# Patient Record
Sex: FEMALE | Race: WHITE | NOT HISPANIC OR LATINO | Employment: STUDENT | URBAN - METROPOLITAN AREA
[De-identification: names, ages, dates, MRNs, and addresses within clinical notes are randomized per-mention and may not be internally consistent; named-entity substitution may affect disease eponyms.]

---
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
## 2025-02-05 ENCOUNTER — HOSPITAL ENCOUNTER (EMERGENCY)
Facility: HOSPITAL | Age: 20
Discharge: HOME | End: 2025-02-05
Payer: MEDICAID

## 2025-02-05 VITALS
BODY MASS INDEX: 28.89 KG/M2 | TEMPERATURE: 99 F | HEART RATE: 89 BPM | HEIGHT: 62 IN | WEIGHT: 157 LBS | RESPIRATION RATE: 18 BRPM | OXYGEN SATURATION: 100 % | DIASTOLIC BLOOD PRESSURE: 86 MMHG | SYSTOLIC BLOOD PRESSURE: 105 MMHG

## 2025-02-05 DIAGNOSIS — N39.0 URINARY TRACT INFECTION WITHOUT HEMATURIA, SITE UNSPECIFIED: Primary | ICD-10-CM

## 2025-02-05 LAB
B-HCG UR QL: NEGATIVE
BACTERIA #/AREA URNS AUTO: ABNORMAL /HPF
BILIRUB UR QL STRIP.AUTO: NEGATIVE
CLARITY UR: ABNORMAL
COLOR UR AUTO: YELLOW
GLUCOSE UR QL STRIP: NEGATIVE
HGB UR QL STRIP: NEGATIVE
KETONES UR QL STRIP: 15
LEUKOCYTE ESTERASE UR QL STRIP: ABNORMAL
MUCOUS THREADS URNS QL MICRO: ABNORMAL /LPF
NITRITE UR QL STRIP: NEGATIVE
PH UR STRIP: 6 [PH]
PROT UR QL STRIP: NEGATIVE
RBC #/AREA URNS AUTO: ABNORMAL /HPF
SP GR UR STRIP.AUTO: >=1.03 (ref 1–1.03)
SQUAMOUS #/AREA URNS AUTO: ABNORMAL /HPF
UROBILINOGEN UR STRIP-ACNC: 0.2
WBC #/AREA URNS AUTO: ABNORMAL /HPF

## 2025-02-05 PROCEDURE — 81015 MICROSCOPIC EXAM OF URINE: CPT | Performed by: NURSE PRACTITIONER

## 2025-02-05 PROCEDURE — 81003 URINALYSIS AUTO W/O SCOPE: CPT | Performed by: NURSE PRACTITIONER

## 2025-02-05 PROCEDURE — 81025 URINE PREGNANCY TEST: CPT | Performed by: NURSE PRACTITIONER

## 2025-02-05 PROCEDURE — 63600175 PHARM REV CODE 636 W HCPCS: Mod: JZ,TB | Performed by: NURSE PRACTITIONER

## 2025-02-05 PROCEDURE — 87086 URINE CULTURE/COLONY COUNT: CPT | Performed by: NURSE PRACTITIONER

## 2025-02-05 PROCEDURE — 96372 THER/PROPH/DIAG INJ SC/IM: CPT | Performed by: NURSE PRACTITIONER

## 2025-02-05 PROCEDURE — 99284 EMERGENCY DEPT VISIT MOD MDM: CPT | Mod: 25

## 2025-02-05 RX ORDER — KETOROLAC TROMETHAMINE 30 MG/ML
60 INJECTION, SOLUTION INTRAMUSCULAR; INTRAVENOUS
Status: COMPLETED | OUTPATIENT
Start: 2025-02-05 | End: 2025-02-05

## 2025-02-05 RX ORDER — SULFAMETHOXAZOLE AND TRIMETHOPRIM 800; 160 MG/1; MG/1
1 TABLET ORAL 2 TIMES DAILY
Qty: 14 TABLET | Refills: 0 | Status: SHIPPED | OUTPATIENT
Start: 2025-02-05 | End: 2025-02-12

## 2025-02-05 RX ORDER — DEXAMETHASONE SODIUM PHOSPHATE 4 MG/ML
8 INJECTION, SOLUTION INTRA-ARTICULAR; INTRALESIONAL; INTRAMUSCULAR; INTRAVENOUS; SOFT TISSUE
Status: COMPLETED | OUTPATIENT
Start: 2025-02-05 | End: 2025-02-05

## 2025-02-05 RX ADMIN — KETOROLAC TROMETHAMINE 60 MG: 30 INJECTION, SOLUTION INTRAMUSCULAR at 05:02

## 2025-02-05 RX ADMIN — DEXAMETHASONE SODIUM PHOSPHATE 8 MG: 4 INJECTION, SOLUTION INTRA-ARTICULAR; INTRALESIONAL; INTRAMUSCULAR; INTRAVENOUS; SOFT TISSUE at 05:02

## 2025-02-05 NOTE — Clinical Note
"Jael Morganza"GriselElda was seen and treated in our emergency department on 2/5/2025.  She may return to work on 02/06/2025.       If you have any questions or concerns, please don't hesitate to call.      Diann Razo, TORIE"

## 2025-02-05 NOTE — ED PROVIDER NOTES
"Encounter Date: 2/5/2025       History     Chief Complaint   Patient presents with    Low-back Pain     Low back pain x 2 day without injury  - denies urinary c/o - LBM: this am "normal"     19 year old female presents with lower back pain x 4 days, denies dysuria, denies injury, woke up from sleep with back hurting.     The history is provided by the patient. No  was used.     Review of patient's allergies indicates:   Allergen Reactions    Red dye Itching and Rash     Past Medical History:   Diagnosis Date    Anxiety disorder, unspecified     Asthma     Bipolar disorder, unspecified     COPD (chronic obstructive pulmonary disease)     Depression     GERD (gastroesophageal reflux disease)      Past Surgical History:   Procedure Laterality Date    oral sx       Family History   Problem Relation Name Age of Onset    Breast cancer Neg Hx      Colon cancer Neg Hx      Ovarian cancer Neg Hx      Uterine cancer Neg Hx      Cervical cancer Neg Hx       Social History     Tobacco Use    Smoking status: Every Day     Types: Vaping with nicotine     Start date: 2019    Smokeless tobacco: Never   Substance Use Topics    Alcohol use: Not Currently     Comment: Social    Drug use: Never     Review of Systems   Musculoskeletal:  Positive for back pain.   All other systems reviewed and are negative.      Physical Exam     Initial Vitals [02/05/25 1657]   BP Pulse Resp Temp SpO2   106/63 83 16 98.4 °F (36.9 °C) 98 %      MAP       --         Physical Exam    Nursing note and vitals reviewed.  Constitutional: She appears well-developed and well-nourished.   HENT:   Head: Normocephalic and atraumatic.   Eyes: Conjunctivae and EOM are normal. Pupils are equal, round, and reactive to light.   Neck: Neck supple.   Normal range of motion.  Cardiovascular:  Normal rate, regular rhythm, normal heart sounds and intact distal pulses.           Pulmonary/Chest: Breath sounds normal.   Abdominal: Abdomen is soft. Bowel " sounds are normal.   Musculoskeletal:         General: Normal range of motion.      Cervical back: Normal range of motion and neck supple.     Neurological: She is alert and oriented to person, place, and time. She has normal strength.   Skin: Skin is warm and dry. Capillary refill takes less than 2 seconds.   Psychiatric: She has a normal mood and affect. Her behavior is normal. Judgment and thought content normal.         ED Course   Procedures  Labs Reviewed   URINALYSIS, REFLEX TO URINE CULTURE - Abnormal       Result Value    Color, UA Yellow      Appearance, UA Slightly Cloudy (*)     Specific Gravity, UA >=1.030      pH, UA 6.0      Protein, UA Negative      Glucose, UA Negative      Ketones, UA 15 (*)     Blood, UA Negative      Bilirubin, UA Negative      Urobilinogen, UA 0.2      Nitrites, UA Negative      Leukocyte Esterase, UA Trace (*)     Narrative:      URINE STABILITY IS 2 HOURS AT ROOM TEMP OR    SIX HOURS REFRIGERATED. PERFORMING TESTING ON    SPECIMENS GREATER THAN THIS AGE MAY AFFECT THE    FOLLOWING TESTS:    PH          SPECIFIC GRAVITY           BLOOD    CLARITY     BILIRUBIN               UROBILINOGEN   URINALYSIS, MICROSCOPIC - Abnormal    Bacteria, UA Moderate (*)     Mucous, UA Moderate (*)     RBC, UA 0-5      WBC, UA 6-10 (*)     Squamous Epithelial Cells, UA Moderate (*)    HCG QUALITATIVE URINE - Normal    hCG Qualitative, Urine Negative     CULTURE, URINE          Imaging Results    None          Medications   dexAMETHasone injection 8 mg (8 mg Intramuscular Given 2/5/25 1740)   ketorolac injection 60 mg (60 mg Intramuscular Given 2/5/25 1741)     Medical Decision Making  Problems Addressed:  Urinary tract infection without hematuria, site unspecified: acute illness or injury    Amount and/or Complexity of Data Reviewed  Labs: ordered. Decision-making details documented in ED Course.    Risk  Prescription drug management.                                      Clinical  Impression:  Final diagnoses:  [N39.0] Urinary tract infection without hematuria, site unspecified (Primary)          ED Disposition Condition    Discharge Stable          ED Prescriptions       Medication Sig Dispense Start Date End Date Auth. Provider    sulfamethoxazole-trimethoprim 800-160mg (BACTRIM DS) 800-160 mg Tab Take 1 tablet by mouth 2 (two) times daily. for 7 days 14 tablet 2/5/2025 2/12/2025 Diann Razo FNP          Follow-up Information       Follow up With Specialties Details Why Contact Info    Lalita Moreno, LISSP-C Family Medicine In 3 days If symptoms worsen [address] [facility] [address]  [phone]               Diann Razo FNP  02/05/25 8813

## 2025-02-08 LAB — BACTERIA UR CULT: NORMAL

## 2025-02-10 NOTE — PROGRESS NOTES
Chief Complaint     Absent Menses (Patient hasn't had a period since ab in December )    HPI:     Patient is a 19 y.o.  presents today to discuss menstrual cycle. Reports has not had cycle since Missed AB 24. UPT today in clinic negative. Reports cyclic menses prior to Missed AB.   Currently on Bactrim for UTI, reports s/s improved at this time.     Gyn History:    Menstrual History  Cycle: No (2024)  Menarche Age: 9 years  Flow: Normal  Intermenstrual Bleeding: No  Dysmenorrhea: No  Dysmenorrhea Severity : Mild    Menopause  Menopause Age: 1 years    Pap History  Last pap date:  (never)    [facility]  Sexually Active: Yes  Sexual Orientation: heterosexual  Postcoital Bleeding: No  Dyspareunia: No  STI History: No  Contraception: No    Breast History  Last Breast Imaging Date: No          Past Medical History:   Diagnosis Date    Anxiety disorder, unspecified     Asthma     Bipolar disorder, unspecified     COPD (chronic obstructive pulmonary disease)     Depression     GERD (gastroesophageal reflux disease)        Past Surgical History:   Procedure Laterality Date    oral sx         Family History   Problem Relation Name Age of Onset    Breast cancer Neg Hx      Colon cancer Neg Hx      Ovarian cancer Neg Hx      Uterine cancer Neg Hx      Cervical cancer Neg Hx         OB History          1    Para   0    Term   0       0    AB   0    Living   0         SAB   0    IAB   0    Ectopic   0    Multiple   0    Live Births   0                 Current Outpatient Medications on File Prior to Visit   Medication Sig Dispense Refill    albuterol (PROVENTIL/VENTOLIN HFA) 90 mcg/actuation inhaler 2 puffs every 4 (four) hours as needed.      sulfamethoxazole-trimethoprim 800-160mg (BACTRIM DS) 800-160 mg Tab Take 1 tablet by mouth 2 (two) times daily. for 7 days 14 tablet 0    traZODone (DESYREL) 100 MG tablet Take 100 mg by mouth every evening. (Patient not taking: Reported on 2025)    "    No current facility-administered medications on file prior to visit.       Review of Systems:       Review of Systems   Constitutional:  Negative for chills and fever.   Gastrointestinal:  Negative for abdominal pain, constipation and diarrhea.   Genitourinary:  Negative for bladder incontinence, decreased libido, dysmenorrhea, dyspareunia, dysuria, flank pain, frequency, genital sores, hematuria, hot flashes, menorrhagia, menstrual problem, pelvic pain, urgency, vaginal bleeding, vaginal discharge, vaginal pain, urinary incontinence, postcoital bleeding, postmenopausal bleeding, vaginal dryness and vaginal odor.        Physical Exam:    /68 (BP Location: Right arm, Patient Position: Sitting)   Ht 5' 2" (1.575 m)   Wt 70.8 kg (156 lb)   LMP 12/19/2024 Comment: miscarried 12/18/24, no menstration since that time  BMI 28.53 kg/m²     Physical Exam   General Exam:    General Appearance: alert, in no acute distress, normal, well nourished.  Psych:  Orientation: time, place, person.  Mood/Affect: Normal       Assessment:   1. Oligomenorrhea, unspecified type    2. Missed menses  -     POCT urine pregnancy  -     medroxyPROGESTERone (PROVERA) 10 MG tablet; Take 1 tablet (10 mg total) by mouth once daily. for 10 days  Dispense: 10 tablet; Refill: 0             Plan:     UPT negative today in clinic  Cont Bactrim for UTI, precautions    We discussed her recent lack of cycles since previous miscarriage.  Has been just shy of 2 months.   I recommend continued menstrual diary and monitoring.    Offered contraception to help control cycles patient declines for now.    Offered Provera challenge to see if she does indeed have a withdrawal bleed.  Patient we would like to do this.  Assuming she does have withdrawal bleed if secondary amenorrhea/oligomenorrhea persist beyond 4-6 months we will require further were  Discussed recent miscarriage and change in cycle patterns.    Discussed conservative management vs " Provera challenge. Desires Provera at this time.   Rx Provera 10mg x10 days   If secondary amenorrhea persists, will perform further workup    RTC prn/annual     This note was transcribed by Denae Turcios. There may be transcription errors as a result, however minimal. Effort has been made to ensure accuracy of transcription, but any obvious errors or omissions should be clarified with the author of the document.       I agree with the above documentation.

## 2025-02-11 ENCOUNTER — OFFICE VISIT (OUTPATIENT)
Dept: OBSTETRICS AND GYNECOLOGY | Facility: CLINIC | Age: 20
End: 2025-02-11
Payer: MEDICAID

## 2025-02-11 VITALS
DIASTOLIC BLOOD PRESSURE: 68 MMHG | WEIGHT: 156 LBS | SYSTOLIC BLOOD PRESSURE: 116 MMHG | HEIGHT: 62 IN | BODY MASS INDEX: 28.71 KG/M2

## 2025-02-11 DIAGNOSIS — N92.6 MISSED MENSES: ICD-10-CM

## 2025-02-11 DIAGNOSIS — N91.5 OLIGOMENORRHEA, UNSPECIFIED TYPE: Primary | ICD-10-CM

## 2025-02-11 LAB
B-HCG UR QL: NEGATIVE
CTP QC/QA: YES

## 2025-02-11 PROCEDURE — 3008F BODY MASS INDEX DOCD: CPT | Mod: CPTII,,, | Performed by: OBSTETRICS & GYNECOLOGY

## 2025-02-11 PROCEDURE — 3074F SYST BP LT 130 MM HG: CPT | Mod: CPTII,,, | Performed by: OBSTETRICS & GYNECOLOGY

## 2025-02-11 PROCEDURE — 99213 OFFICE O/P EST LOW 20 MIN: CPT | Mod: ,,, | Performed by: OBSTETRICS & GYNECOLOGY

## 2025-02-11 PROCEDURE — 1159F MED LIST DOCD IN RCRD: CPT | Mod: CPTII,,, | Performed by: OBSTETRICS & GYNECOLOGY

## 2025-02-11 PROCEDURE — 81025 URINE PREGNANCY TEST: CPT | Mod: ,,, | Performed by: OBSTETRICS & GYNECOLOGY

## 2025-02-11 PROCEDURE — 3078F DIAST BP <80 MM HG: CPT | Mod: CPTII,,, | Performed by: OBSTETRICS & GYNECOLOGY

## 2025-02-11 RX ORDER — MEDROXYPROGESTERONE ACETATE 10 MG/1
10 TABLET ORAL DAILY
Qty: 10 TABLET | Refills: 0 | Status: SHIPPED | OUTPATIENT
Start: 2025-02-11 | End: 2025-02-21

## 2025-02-28 ENCOUNTER — HOSPITAL ENCOUNTER (EMERGENCY)
Facility: HOSPITAL | Age: 20
Discharge: HOME | End: 2025-02-28
Payer: MEDICAID

## 2025-02-28 VITALS
SYSTOLIC BLOOD PRESSURE: 111 MMHG | OXYGEN SATURATION: 100 % | DIASTOLIC BLOOD PRESSURE: 71 MMHG | RESPIRATION RATE: 18 BRPM | HEIGHT: 62 IN | BODY MASS INDEX: 30.36 KG/M2 | WEIGHT: 165 LBS | HEART RATE: 79 BPM | TEMPERATURE: 98 F

## 2025-02-28 DIAGNOSIS — N39.0 URINARY TRACT INFECTION WITHOUT HEMATURIA, SITE UNSPECIFIED: Primary | ICD-10-CM

## 2025-02-28 LAB
BACTERIA #/AREA URNS AUTO: ABNORMAL /HPF
BILIRUB UR QL STRIP.AUTO: NEGATIVE
CLARITY UR: CLEAR
COLOR UR AUTO: YELLOW
GLUCOSE UR QL STRIP: NEGATIVE
HGB UR QL STRIP: NEGATIVE
KETONES UR QL STRIP: NEGATIVE
LEUKOCYTE ESTERASE UR QL STRIP: ABNORMAL
MUCOUS THREADS URNS QL MICRO: ABNORMAL /LPF
NITRITE UR QL STRIP: NEGATIVE
PH UR STRIP: 6.5 [PH]
PROT UR QL STRIP: NEGATIVE
RBC #/AREA URNS AUTO: ABNORMAL /HPF
SP GR UR STRIP.AUTO: 1.02 (ref 1–1.03)
SQUAMOUS #/AREA URNS AUTO: ABNORMAL /HPF
UROBILINOGEN UR STRIP-ACNC: 0.2
WBC #/AREA URNS AUTO: ABNORMAL /HPF

## 2025-02-28 PROCEDURE — 81003 URINALYSIS AUTO W/O SCOPE: CPT | Performed by: NURSE PRACTITIONER

## 2025-02-28 PROCEDURE — 81015 MICROSCOPIC EXAM OF URINE: CPT | Performed by: NURSE PRACTITIONER

## 2025-02-28 PROCEDURE — 87086 URINE CULTURE/COLONY COUNT: CPT | Performed by: NURSE PRACTITIONER

## 2025-02-28 PROCEDURE — 99283 EMERGENCY DEPT VISIT LOW MDM: CPT

## 2025-02-28 PROCEDURE — 25000003 PHARM REV CODE 250: Performed by: NURSE PRACTITIONER

## 2025-02-28 RX ORDER — SULFAMETHOXAZOLE AND TRIMETHOPRIM 800; 160 MG/1; MG/1
1 TABLET ORAL 2 TIMES DAILY
Qty: 14 TABLET | Refills: 0 | Status: SHIPPED | OUTPATIENT
Start: 2025-02-28 | End: 2025-03-07

## 2025-02-28 RX ORDER — SULFAMETHOXAZOLE AND TRIMETHOPRIM 800; 160 MG/1; MG/1
1 TABLET ORAL
Status: COMPLETED | OUTPATIENT
Start: 2025-02-28 | End: 2025-02-28

## 2025-02-28 RX ADMIN — SULFAMETHOXAZOLE AND TRIMETHOPRIM 1 TABLET: 800; 160 TABLET ORAL at 07:02

## 2025-02-28 NOTE — Clinical Note
"Jael Morganza" Elda was seen and treated in our emergency department on 2/28/2025.  She may return to work on 03/01/2025.       If you have any questions or concerns, please don't hesitate to call.      Diann Razo, TORIE"

## 2025-03-01 NOTE — ED PROVIDER NOTES
Encounter Date: 2/28/2025       History     Chief Complaint   Patient presents with    Back Pain     Pt reports lower back pain for 3 weeks, seen here, took abx without improvement.     20-year-old female presents with lower back pain x3 weeks, known UTI on February 5th of this year, patient took antibiotic treatment, thinks UTI has returned.    The history is provided by the patient. No  was used.     Review of patient's allergies indicates:   Allergen Reactions    Red dye Itching and Rash     Past Medical History:   Diagnosis Date    Anxiety disorder, unspecified     Asthma     Bipolar disorder, unspecified     COPD (chronic obstructive pulmonary disease)     Depression     GERD (gastroesophageal reflux disease)      Past Surgical History:   Procedure Laterality Date    oral sx       Family History   Problem Relation Name Age of Onset    Breast cancer Neg Hx      Colon cancer Neg Hx      Ovarian cancer Neg Hx      Uterine cancer Neg Hx      Cervical cancer Neg Hx       Social History[1]  Review of Systems   Genitourinary:  Positive for dysuria and urgency.   Musculoskeletal:  Positive for back pain.   All other systems reviewed and are negative.      Physical Exam     Initial Vitals [02/28/25 1844]   BP Pulse Resp Temp SpO2   113/82 85 18 98.4 °F (36.9 °C) 98 %      MAP       --         Physical Exam    Nursing note and vitals reviewed.  Constitutional: She appears well-developed and well-nourished.   HENT:   Head: Normocephalic and atraumatic.   Eyes: Conjunctivae and EOM are normal. Pupils are equal, round, and reactive to light.   Neck: Neck supple.   Normal range of motion.  Cardiovascular:  Normal rate, regular rhythm, normal heart sounds and intact distal pulses.           Pulmonary/Chest: Breath sounds normal.   Abdominal: Abdomen is soft. Bowel sounds are normal.   Musculoskeletal:         General: Normal range of motion.      Cervical back: Normal range of motion and neck supple.      Neurological: She is alert and oriented to person, place, and time. She has normal strength.   Skin: Skin is warm and dry. Capillary refill takes less than 2 seconds.   Psychiatric: She has a normal mood and affect. Her behavior is normal. Judgment and thought content normal.         ED Course   Procedures  Labs Reviewed   URINALYSIS, REFLEX TO URINE CULTURE - Abnormal       Result Value    Color, UA Yellow      Appearance, UA Clear      Specific Gravity, UA 1.020      pH, UA 6.5      Protein, UA Negative      Glucose, UA Negative      Ketones, UA Negative      Blood, UA Negative      Bilirubin, UA Negative      Urobilinogen, UA 0.2      Nitrites, UA Negative      Leukocyte Esterase, UA Trace (*)     Narrative:      URINE STABILITY IS 2 HOURS AT ROOM TEMP OR    SIX HOURS REFRIGERATED. PERFORMING TESTING ON    SPECIMENS GREATER THAN THIS AGE MAY AFFECT THE    FOLLOWING TESTS:    PH          SPECIFIC GRAVITY           BLOOD    CLARITY     BILIRUBIN               UROBILINOGEN   URINALYSIS, MICROSCOPIC - Abnormal    Bacteria, UA Few (*)     Mucous, UA Small (*)     RBC, UA None Seen      WBC, UA 6-10 (*)     Squamous Epithelial Cells, UA Few (*)    CULTURE, URINE          Imaging Results    None          Medications   sulfamethoxazole-trimethoprim 800-160mg per tablet 1 tablet (has no administration in time range)     Medical Decision Making  Problems Addressed:  Urinary tract infection without hematuria, site unspecified: acute illness or injury    Amount and/or Complexity of Data Reviewed  Labs: ordered. Decision-making details documented in ED Course.    Risk  Prescription drug management.                                      Clinical Impression:  Final diagnoses:  [N39.0] Urinary tract infection without hematuria, site unspecified (Primary)          ED Disposition Condition    Discharge Stable          ED Prescriptions       Medication Sig Dispense Start Date End Date Auth. Provider     sulfamethoxazole-trimethoprim 800-160mg (BACTRIM DS) 800-160 mg Tab Take 1 tablet by mouth 2 (two) times daily. for 7 days 14 tablet 2/28/2025 3/7/2025 Diann Razo FNP          Follow-up Information       Follow up With Specialties Details Why Contact Info    Lalita Moreno FNP-C Family Medicine In 3 days If symptoms worsen [address]  [facility] [address]  [phone]                   [1]   Social History  Tobacco Use    Smoking status: Every Day     Types: Vaping with nicotine     Start date: 2019    Smokeless tobacco: Never   Substance Use Topics    Alcohol use: Not Currently     Comment: Social    Drug use: Never        Diann Razo FNP  02/28/25 1919

## 2025-03-03 LAB — BACTERIA UR CULT: NO GROWTH

## 2025-03-24 ENCOUNTER — OFFICE VISIT (OUTPATIENT)
Dept: OBSTETRICS AND GYNECOLOGY | Facility: CLINIC | Age: 20
End: 2025-03-24
Payer: MEDICAID

## 2025-03-24 VITALS
DIASTOLIC BLOOD PRESSURE: 76 MMHG | HEIGHT: 62 IN | SYSTOLIC BLOOD PRESSURE: 118 MMHG | BODY MASS INDEX: 29.04 KG/M2 | WEIGHT: 157.81 LBS | TEMPERATURE: 98 F

## 2025-03-24 DIAGNOSIS — Z32.01 POSITIVE PREGNANCY TEST: ICD-10-CM

## 2025-03-24 DIAGNOSIS — N91.5 OLIGOMENORRHEA, UNSPECIFIED TYPE: Primary | ICD-10-CM

## 2025-03-24 LAB
B-HCG UR QL: POSITIVE
CTP QC/QA: YES

## 2025-03-24 PROCEDURE — 87491 CHLMYD TRACH DNA AMP PROBE: CPT | Performed by: NURSE PRACTITIONER

## 2025-03-24 NOTE — PROGRESS NOTES
Chief Complaint:  c/o missed cycle    History of Present Illness:  Patient is a 20 y.o.  presents c/o missed cycle. Patient's last menstrual period was 2025 (exact date). Prior to this, monthly cycles. Denies any vaginal bleeding or pelvic pain.  Missed Ab 24, was seen by Dr Navarro        Gyn History:  Menstrual History  Cycle: Yes  Menarche Age: 9 years  Flow Duration:  (3 days)  Flow: Normal  Interval: 28  Intermenstrual Bleeding: No  Dysmenorrhea: No    Menopause  Menopause Age: 1 years    Pap History  HPV Vaccine Injection Type: 3 Injection Series (3/3)    [place]  Sexually Active: Yes  Sexual Orientation: heterosexual  Postcoital Bleeding: No  Dyspareunia: No  STI History: No  Contraception: No    Breast History  Last Breast Imaging Date: No  History of Breast Biopsy: No      Review of systems:  General/Constitutional: Chills denies. Fatigue/weakness denies. Fever denies. Night sweats denies. Hot flashes denies  Breast: [place] denies. Breast mass denies. Breast pain/tenderness denies. Nipple discharge denies. [place]uck[place] denies.  Gastrointestinal: Abdominal pain denies. Blood in stool denies. Constipation denies. Diarrhea denies. Heartburn denies. Nausea denies. Vomiting denies   Genitourinary: Incontinence denies. Blood in urine denies. Frequent urination denies. Urgency denies. Painful urination denies. Nocturia denies   Gynecologic: Irregular menses denies. Heavy bleeding denies. Painful menses denies. Vaginal discharge denies. Vaginal odor denies. Vaginal itching/Irritation denies. Vaginal lesion denies.  Pelvic pain denies. Decreased libido denies. Vulvar lesion denies. Prolapse of genital organs denies. Painful intercourse denies. Postcoital bleeding denies   Psychiatric: Mood lability denies. Depressed mood denies. Suicidal thoughts denies. Anxiety denies. Overwhelmed denies. Appetite normal. Energy level normal.     OB History    Para Term  AB Living   1 0 0 0 1 0  "  SAB IAB Ectopic Multiple Live Births   1 0 0 0 0      # 1 - Date: 2024, Sex: None, Weight: None, GA: None, Type: Spontaneous , Apgar1: None, Apgar5: None, Living: Fetal Demise, Birth Comments: None      Past Medical History:   Diagnosis Date    Anxiety disorder, unspecified     Asthma     Bipolar disorder, unspecified     COPD (chronic obstructive pulmonary disease)     Depression     GERD (gastroesophageal reflux disease)      Current Medications[1]      Physical Exam:  /76 (Patient Position: Sitting)   Temp 97.7 °F (36.5 °C)   Ht 5' 2" (1.575 m)   Wt 71.6 kg (157 lb 12.8 oz)   LMP 2025 (Exact Date) Comment: miscarried 24, no menstration since that time  BMI 28.86 kg/m²     Constitutional: General appearance: healthy, well-nourished and well-developed   Psychiatric:  Orientation to time, place and person. Normal mood and affect and active, alert       Assessment/Plan:  1. Oligomenorrhea, positive pregnancy test  UPT+   Educated   No GEOVANNY   PNV, folic acid  Pain/fever/bleeding prec     Based on LMP, GA today 4w2d with MANISHA of 25  Follow up new ob visit in 4 weeks w/ Dr. Lewis    2. Tobacco use  Discussed harmful effects of tobacco and addictive characteristics     Advised cessation of use     Patient states understanding            This note was transcribed by Abby Mtz MA. There may be transcription errors as a result, however minimal. I agree with transcription and every effort has been made to ensure accuracy of transcription, but any obvious errors or omissions should be clarified with the author of the document.                 [1]   Current Outpatient Medications:     albuterol (PROVENTIL/VENTOLIN HFA) 90 mcg/actuation inhaler, 2 puffs every 4 (four) hours as needed., Disp: , Rfl:     "

## 2025-03-26 LAB
C TRACH RRNA SPEC QL NAA+PROBE: NEGATIVE
N GONORRHOEA RRNA SPEC QL NAA+PROBE: NEGATIVE
SPECIMEN SOURCE: NORMAL
SPECIMEN SOURCE: NORMAL

## 2025-03-27 ENCOUNTER — RESULTS FOLLOW-UP (OUTPATIENT)
Dept: OBSTETRICS AND GYNECOLOGY | Facility: CLINIC | Age: 20
End: 2025-03-27

## 2025-04-08 NOTE — PROGRESS NOTES
Chief Complaint:  Initial Prenatal Visit (Pt is here for Initial OB exam. Pt c/o nausea)    History of Present Illness:  Jael Schroeder is a 20 y.o. year old  presents for her new ob, 7w2s by LMP. Patient's last menstrual period was 2025 (exact date).  C/o nausea.       Denies hx of genetic disorders for self, FOB    Gyn History:    Menstrual History  Cycle: Yes (LMP:25)  Menarche Age: 9 years  Flow Duration: 5  Flow: Normal  Interval: 28  Intermenstrual Bleeding: No  Dysmenorrhea: Yes  Dysmenorrhea Severity : Mild    Menopause  Menopause Age: 1 years    Pap History  HPV Vaccine Completed: Yes  HPV Vaccine Injection Type: 3 Injection Series    [place]  Sexually Active: Yes  Sexual Orientation: heterosexual  Postcoital Bleeding: No  Dyspareunia: No  STI History: No  Contraception: No    Breast History  Last Breast Imaging Date: No  History of Breast Biopsy: No    Review of Systems:  General/Constitutional: Chills denies. Fatigue/weakness denies. Fever denies. Night sweats denies. Hot flashes denies.   Respiratory: Cough denies. Hemoptysis denies. SOB denies. Sputum production denies. Wheezing denies.   Cardiovascular: Chest pain denies. Dizziness denies. Palpitations denies. Swelling in hands/feet denies.    Gastrointestinal: Abdominal pain denies. Blood in stool denies. Constipation denies. Diarrhea denies. Heartburn denies. Nausea denies. Vomiting denies.   Genitourinary: Incontinence denies. Blood in urine denies. Frequent urination denies. Painful urination denies.  Urinary urgency denies.  Nocturia denies.   Gynecologic: Irregular menses denies. Heavy bleeding denies. Painful menses denies. Vaginal discharge denies. Vaginal odor denies. Vaginal itching denies. Vaginal lesion denies.  Pelvic pain denies. Decreased libido denies. Vulvar lesion denies. Prolapse of genital organs denies. Painful intercourse denies. Postcoital bleeding denies.   Psychiatric: Depression denies. Anxiety denies.      OB History    Para Term  AB Living   2 0 0 0 1 0   SAB IAB Ectopic Multiple Live Births   1 0 0 0 0      # 1 - Date: 2024, Sex: None, Weight: None, GA: None, Type: Spontaneous , Apgar1: None, Apgar5: None, Living: Fetal Demise, Birth Comments: None    # 2 - Date: None, Sex: None, Weight: None, GA: None, Type: None, Apgar1: None, Apgar5: None, Living: None, Birth Comments: None        Past Medical History:   Diagnosis Date    Anxiety disorder, unspecified     Asthma     Bipolar disorder, unspecified     COPD (chronic obstructive pulmonary disease)     Depression     GERD (gastroesophageal reflux disease)      Current Medications[1]    Review of patient's allergies indicates:   Allergen Reactions    Red dye Itching and Rash       Past Surgical History:   Procedure Laterality Date    oral sx       Family History   Problem Relation Name Age of Onset    Breast cancer Neg Hx      Colon cancer Neg Hx      Ovarian cancer Neg Hx      Uterine cancer Neg Hx      Cervical cancer Neg Hx       Social History[2]    Physical Exam:  /72   Wt 73.6 kg (162 lb 3.2 oz)   LMP 2025 (Exact Date) Comment: miscarried 24, no menstration since that time  BMI 29.67 kg/m²     Chaperone: present.   General Exam:    General Appearance: alert, in no acute distress, normal, well nourished.  Psych:  Orientation: time, place, person.  Mood/Affect: Normal   Abdomen:  - Soft. Non-tender. No rebound tenderness or guardingNo masses. Liver normal. Spleen normal. No hernia palpable.  Pelvis:   - Vulva: Normal   - Perianal skin: Normal   - Urethra: Normal meatus. Q-tip: Not performed   - Vagina: NormalVaginal discharge present: . Cystocele: Absent. Rectocele: Absent   - Cervix: Normal. Cervical motion tenderness Absent   - Uterus: Mobile. Non-tender.   - Adnexal: Normal       TVUS:    IUP W CRL: 7w3d  MANISHA: 25 by LMP c/w today's US  FHT:150  OVARIES: normal  FREE FLUID:absent      Assessment/Plan:  1.  Anxiety during pregnancy  -     Hepatitis C Antibody; Future; Expected date: 04/14/2025  -     HIV 1/2 Ag/Ab (4th Gen); Future; Expected date: 04/14/2025  -     Hepatitis B surface antigen; Future; Expected date: 04/14/2025  -     Type & Screen; Future; Expected date: 04/14/2025  -     Rubella antibody, IgG; Future; Expected date: 04/14/2025  -     Urine culture  -     CBC auto differential; Future; Expected date: 04/14/2025  -     Basic metabolic panel; Future; Expected date: 04/14/2025  -     TSH; Future; Expected date: 04/14/2025  -     Hemoglobin A1C; Future; Expected date: 04/14/2025  -     Varicella Zoster Antibody, IgG; Future; Expected date: 04/14/2025    2. 7 weeks gestation of pregnancy    3. Positive pregnancy test  -     C.trach/N.gonor AMP RNA  -     Trichomonas vaginalis, RNA, Qual  -     Urine Culture High Risk  -     US OB/GYN Procedure ([facility]) - Extended List  -     POCT Urinalysis, Dipstick, Automated, W/O Scope    4. Asthma during pregnancy  -     Hepatitis C Antibody; Future; Expected date: 04/14/2025  -     HIV 1/2 Ag/Ab (4th Gen); Future; Expected date: 04/14/2025  -     Hepatitis B surface antigen; Future; Expected date: 04/14/2025  -     Type & Screen; Future; Expected date: 04/14/2025  -     Rubella antibody, IgG; Future; Expected date: 04/14/2025  -     Urine culture  -     CBC auto differential; Future; Expected date: 04/14/2025  -     Basic metabolic panel; Future; Expected date: 04/14/2025  -     TSH; Future; Expected date: 04/14/2025  -     Hemoglobin A1C; Future; Expected date: 04/14/2025  -     Varicella Zoster Antibody, IgG; Future; Expected date: 04/14/2025    5. Depression affecting pregnancy  -     Hepatitis C Antibody; Future; Expected date: 04/14/2025  -     HIV 1/2 Ag/Ab (4th Gen); Future; Expected date: 04/14/2025  -     Hepatitis B surface antigen; Future; Expected date: 04/14/2025  -     Type & Screen; Future; Expected date: 04/14/2025  -     Rubella antibody, IgG;  Future; Expected date: 04/14/2025  -     Urine culture  -     CBC auto differential; Future; Expected date: 04/14/2025  -     Basic metabolic panel; Future; Expected date: 04/14/2025  -     TSH; Future; Expected date: 04/14/2025  -     Hemoglobin A1C; Future; Expected date: 04/14/2025  -     Varicella Zoster Antibody, IgG; Future; Expected date: 04/14/2025    6. Gastroesophageal reflux in pregnancy  -     Hepatitis C Antibody; Future; Expected date: 04/14/2025  -     HIV 1/2 Ag/Ab (4th Gen); Future; Expected date: 04/14/2025  -     Hepatitis B surface antigen; Future; Expected date: 04/14/2025  -     Type & Screen; Future; Expected date: 04/14/2025  -     Rubella antibody, IgG; Future; Expected date: 04/14/2025  -     Urine culture  -     CBC auto differential; Future; Expected date: 04/14/2025  -     Basic metabolic panel; Future; Expected date: 04/14/2025  -     TSH; Future; Expected date: 04/14/2025  -     Hemoglobin A1C; Future; Expected date: 04/14/2025  -     Varicella Zoster Antibody, IgG; Future; Expected date: 04/14/2025    7. Nausea and vomiting during pregnancy           Prenatal counseling  Discussed appropriate weight gain for pregnancy  Tobacco avoidance/cessation  Illicit drug avoidance  PNL  PNV, folic acid     GC/CZ/TV  urine    Breastfeeding education given     Discussed risk of asthma in pregnancy.  Genu albuterol PRN.    Discussed risk of anxiety and depression in pregnancy.  Currently stable without medication.    Discussed history of gastroesophageal reflux in pregnancy.  This may worsen over the course of pregnancy.  Discussed over-the-counter medications such as Pepcid or Prilosec of which are generally safe for pregnancy.     B6/unisom prn N/V  Call if no relief     RTC 4 weeks.      This note was transcribed by Denae Turcios. There may be transcription errors as a result, however minimal. Effort has been made to ensure accuracy of transcription, but any obvious errors or omissions should be  clarified with the author of the document.       I agree with the above documentation.                [1]   Current Outpatient Medications:     albuterol (PROVENTIL/VENTOLIN HFA) 90 mcg/actuation inhaler, 2 puffs every 4 (four) hours as needed., Disp: , Rfl:   [2]   Social History  Socioeconomic History    Marital status: Single   Tobacco Use    Smoking status: Some Days     Types: Vaping with nicotine     Start date: 2019    Smokeless tobacco: Never   Substance and Sexual Activity    Alcohol use: Not Currently     Comment: Social    Drug use: Never    Sexual activity: Yes     Partners: Male     Birth control/protection: None

## 2025-04-14 ENCOUNTER — INITIAL PRENATAL (OUTPATIENT)
Dept: OBSTETRICS AND GYNECOLOGY | Facility: CLINIC | Age: 20
End: 2025-04-14
Payer: MEDICAID

## 2025-04-14 ENCOUNTER — RESULTS FOLLOW-UP (OUTPATIENT)
Dept: OBSTETRICS AND GYNECOLOGY | Facility: HOSPITAL | Age: 20
End: 2025-04-14
Payer: MEDICAID

## 2025-04-14 VITALS
DIASTOLIC BLOOD PRESSURE: 72 MMHG | SYSTOLIC BLOOD PRESSURE: 116 MMHG | WEIGHT: 162.19 LBS | BODY MASS INDEX: 29.67 KG/M2

## 2025-04-14 DIAGNOSIS — O99.619 GASTROESOPHAGEAL REFLUX IN PREGNANCY: ICD-10-CM

## 2025-04-14 DIAGNOSIS — N39.0 URINARY TRACT INFECTION WITHOUT HEMATURIA, SITE UNSPECIFIED: Primary | ICD-10-CM

## 2025-04-14 DIAGNOSIS — F41.9 ANXIETY DURING PREGNANCY: Primary | ICD-10-CM

## 2025-04-14 DIAGNOSIS — F32.A DEPRESSION AFFECTING PREGNANCY: ICD-10-CM

## 2025-04-14 DIAGNOSIS — Z3A.01 7 WEEKS GESTATION OF PREGNANCY: ICD-10-CM

## 2025-04-14 DIAGNOSIS — O99.519 ASTHMA DURING PREGNANCY: ICD-10-CM

## 2025-04-14 DIAGNOSIS — O99.340 DEPRESSION AFFECTING PREGNANCY: ICD-10-CM

## 2025-04-14 DIAGNOSIS — O99.340 ANXIETY DURING PREGNANCY: Primary | ICD-10-CM

## 2025-04-14 DIAGNOSIS — K21.9 GASTROESOPHAGEAL REFLUX IN PREGNANCY: ICD-10-CM

## 2025-04-14 DIAGNOSIS — J45.909 ASTHMA DURING PREGNANCY: ICD-10-CM

## 2025-04-14 DIAGNOSIS — Z32.01 POSITIVE PREGNANCY TEST: ICD-10-CM

## 2025-04-14 DIAGNOSIS — O21.9 NAUSEA AND VOMITING DURING PREGNANCY: ICD-10-CM

## 2025-04-14 LAB
BILIRUB UR QL STRIP: NEGATIVE
GLUCOSE UR QL STRIP: NEGATIVE
KETONES UR QL STRIP: NEGATIVE
LEUKOCYTE ESTERASE UR QL STRIP: POSITIVE
PH, POC UA: 5.5
POC BLOOD, URINE: NEGATIVE
POC NITRATES, URINE: POSITIVE
PROT UR QL STRIP: NEGATIVE
SP GR UR STRIP: 1.02 (ref 1–1.03)
UROBILINOGEN UR STRIP-ACNC: 0.2 (ref 0.1–1.1)

## 2025-04-14 PROCEDURE — 87661 TRICHOMONAS VAGINALIS AMPLIF: CPT | Performed by: OBSTETRICS & GYNECOLOGY

## 2025-04-14 PROCEDURE — 87086 URINE CULTURE/COLONY COUNT: CPT | Performed by: OBSTETRICS & GYNECOLOGY

## 2025-04-14 PROCEDURE — 87591 N.GONORRHOEAE DNA AMP PROB: CPT | Performed by: OBSTETRICS & GYNECOLOGY

## 2025-04-14 RX ORDER — CEPHALEXIN 500 MG/1
500 CAPSULE ORAL 4 TIMES DAILY
Qty: 28 CAPSULE | Refills: 0 | Status: CANCELLED | OUTPATIENT
Start: 2025-04-14 | End: 2025-04-21

## 2025-04-14 NOTE — PROGRESS NOTES
Phoned patient to notify of results. Patient has allergy to red dye, contraindication to Keflex. Will notify Dr. Lewis on tomorrow and f/u with patient. Patient verbalized understanding.

## 2025-04-15 RX ORDER — CEPHALEXIN 500 MG/1
500 CAPSULE ORAL 2 TIMES DAILY
Qty: 14 CAPSULE | Refills: 0 | Status: SHIPPED | OUTPATIENT
Start: 2025-04-15 | End: 2025-04-22

## 2025-04-18 LAB — BACTERIA UR CULT: ABNORMAL

## 2025-05-02 ENCOUNTER — LAB VISIT (OUTPATIENT)
Dept: LAB | Facility: HOSPITAL | Age: 20
End: 2025-05-02
Attending: OBSTETRICS & GYNECOLOGY
Payer: MEDICAID

## 2025-05-02 DIAGNOSIS — O99.340 DEPRESSION AFFECTING PREGNANCY: ICD-10-CM

## 2025-05-02 DIAGNOSIS — O99.519 ASTHMA DURING PREGNANCY: ICD-10-CM

## 2025-05-02 DIAGNOSIS — F32.A DEPRESSION AFFECTING PREGNANCY: ICD-10-CM

## 2025-05-02 DIAGNOSIS — J45.909 ASTHMA DURING PREGNANCY: ICD-10-CM

## 2025-05-02 DIAGNOSIS — F41.9 ANXIETY DURING PREGNANCY: ICD-10-CM

## 2025-05-02 DIAGNOSIS — O99.340 ANXIETY DURING PREGNANCY: ICD-10-CM

## 2025-05-02 DIAGNOSIS — K21.9 GASTROESOPHAGEAL REFLUX IN PREGNANCY: ICD-10-CM

## 2025-05-02 DIAGNOSIS — O99.619 GASTROESOPHAGEAL REFLUX IN PREGNANCY: ICD-10-CM

## 2025-05-02 LAB
ANION GAP SERPL CALC-SCNC: 10 MEQ/L (ref 2–13)
BASOPHILS # BLD AUTO: 0.03 X10(3)/MCL (ref 0.01–0.08)
BASOPHILS NFR BLD AUTO: 0.2 % (ref 0.1–1.2)
BUN SERPL-MCNC: 6 MG/DL (ref 7–20)
CALCIUM SERPL-MCNC: 9.9 MG/DL (ref 8.4–10.2)
CHLORIDE SERPL-SCNC: 104 MMOL/L (ref 98–110)
CO2 SERPL-SCNC: 21 MMOL/L (ref 21–32)
CREAT SERPL-MCNC: 0.49 MG/DL (ref 0.66–1.25)
CREAT/UREA NIT SERPL: 12 (ref 12–20)
EOSINOPHIL # BLD AUTO: 0.02 X10(3)/MCL (ref 0.04–0.36)
EOSINOPHIL NFR BLD AUTO: 0.2 % (ref 0.7–7)
ERYTHROCYTE [DISTWIDTH] IN BLOOD BY AUTOMATED COUNT: 12.7 % (ref 11–14.5)
EST. AVERAGE GLUCOSE BLD GHB EST-MCNC: 93.9 MG/DL (ref 70–115)
GFR SERPLBLD CREATININE-BSD FMLA CKD-EPI: >90 ML/MIN/1.73/M2
GLUCOSE SERPL-MCNC: 81 MG/DL (ref 70–115)
GROUP & RH: NORMAL
HBA1C MFR BLD: 4.9 % (ref 4–6)
HBV SURFACE AG SERPL QL IA: NEGATIVE
HBV SURFACE AG SERPL QL IA: NORMAL
HCT VFR BLD AUTO: 36.9 % (ref 36–48)
HCV AB SERPL QL IA: NONREACTIVE
HGB BLD-MCNC: 12.9 G/DL (ref 11.8–16)
HIV 1+2 AB+HIV1 P24 AG SERPL QL IA: NONREACTIVE
IMM GRANULOCYTES # BLD AUTO: 0.06 X10(3)/MCL (ref 0–0.03)
IMM GRANULOCYTES NFR BLD AUTO: 0.5 % (ref 0–0.5)
INDIRECT COOMBS: NORMAL
LYMPHOCYTES # BLD AUTO: 2.34 X10(3)/MCL (ref 1.16–3.74)
LYMPHOCYTES NFR BLD AUTO: 19.1 % (ref 20–55)
MCH RBC QN AUTO: 30.6 PG (ref 27–34)
MCHC RBC AUTO-ENTMCNC: 35 G/DL (ref 31–37)
MCV RBC AUTO: 87.6 FL (ref 79–99)
MONOCYTES # BLD AUTO: 0.79 X10(3)/MCL (ref 0.24–0.36)
MONOCYTES NFR BLD AUTO: 6.4 % (ref 4.7–12.5)
NEUTROPHILS # BLD AUTO: 9.04 X10(3)/MCL (ref 1.56–6.13)
NEUTROPHILS NFR BLD AUTO: 73.6 % (ref 37–73)
NRBC BLD AUTO-RTO: 0 %
PLATELET # BLD AUTO: 304 X10(3)/MCL (ref 140–371)
PMV BLD AUTO: 10.3 FL (ref 9.4–12.4)
POTASSIUM SERPL-SCNC: 4.1 MMOL/L (ref 3.5–5.1)
RBC # BLD AUTO: 4.21 X10(6)/MCL (ref 4–5.1)
RUBELLA AB, IGG (OLG): 2.12 IU/ML
SODIUM SERPL-SCNC: 135 MMOL/L (ref 136–145)
SPECIMEN OUTDATE: NORMAL
TSH SERPL-ACNC: 2.54 UIU/ML (ref 0.36–3.74)
WBC # BLD AUTO: 12.28 X10(3)/MCL (ref 4–11.5)

## 2025-05-02 PROCEDURE — 83036 HEMOGLOBIN GLYCOSYLATED A1C: CPT

## 2025-05-02 PROCEDURE — 87389 HIV-1 AG W/HIV-1&-2 AB AG IA: CPT

## 2025-05-02 PROCEDURE — 86850 RBC ANTIBODY SCREEN: CPT | Performed by: OBSTETRICS & GYNECOLOGY

## 2025-05-02 PROCEDURE — 87340 HEPATITIS B SURFACE AG IA: CPT

## 2025-05-02 PROCEDURE — 86762 RUBELLA ANTIBODY: CPT

## 2025-05-02 PROCEDURE — 36415 COLL VENOUS BLD VENIPUNCTURE: CPT

## 2025-05-02 PROCEDURE — 86787 VARICELLA-ZOSTER ANTIBODY: CPT

## 2025-05-02 PROCEDURE — 84443 ASSAY THYROID STIM HORMONE: CPT

## 2025-05-02 PROCEDURE — 85025 COMPLETE CBC W/AUTO DIFF WBC: CPT

## 2025-05-02 PROCEDURE — 80048 BASIC METABOLIC PNL TOTAL CA: CPT

## 2025-05-02 PROCEDURE — 86803 HEPATITIS C AB TEST: CPT

## 2025-05-05 LAB
VZV IGG SER IA-ACNC: 0.3
VZV IGG SER QL IA: NEGATIVE

## 2025-05-07 NOTE — PROGRESS NOTES
HPI  20 y.o.  at 11w2d  here for OB check. Feeling better.  Nausea improving.        ROS  Review of Systems   Constitutional:  Negative for chills and fever.   Gastrointestinal:  Negative for abdominal pain, constipation and diarrhea.   Genitourinary:  Negative for flank pain, genital sores, pelvic pain, urgency, vaginal bleeding, vaginal discharge, vaginal pain, postcoital bleeding and vaginal odor.         OBJECTIVE  /78   Wt 73.9 kg (163 lb)   LMP 2025 (Exact Date) Comment: miscarried 24, no menstration since that time  BMI 29.81 kg/m²     Gen: No distress  Abdomen: Gravid, non-tender  Extremities: No edema   FHT: 170      ASSESSMENT  1. Depression affecting pregnancy    2. 11 weeks gestation of pregnancy  - POCT Urinalysis, Dipstick, Automated, W/O Scope    3. Anxiety during pregnancy    4. Gastroesophageal reflux in pregnancy    5. Nausea and vomiting during pregnancy    6. Asthma during pregnancy    7. Maternal varicella, non-immune    8. Rubella non-immune status, antepartum        PLAN  Reviewed standard labor unit and kick count precautions.  Discussed pre-eclampsia precautions.  Discussed COVID-19 risks, social distancing, and isolation if respiratory symptoms develop.     Discussed Rubella non immune. Avoidance of children with rash, viral illness. Educated on strict precautions.     Varicella nonimmune. Recommend vaccine following delivery. Avoidance of sick children, rashes, chicken pox.     N/V improving  Doing well    RTC 4 WEEKS OB CHECK     This note was transcribed by Denae Turcios. There may be transcription errors as a result, however minimal. Effort has been made to ensure accuracy of transcription, but any obvious errors or omissions should be clarified with the author of the document.       I agree with the above documentation.

## 2025-05-12 ENCOUNTER — ROUTINE PRENATAL (OUTPATIENT)
Dept: OBSTETRICS AND GYNECOLOGY | Facility: CLINIC | Age: 20
End: 2025-05-12
Payer: MEDICAID

## 2025-05-12 VITALS — DIASTOLIC BLOOD PRESSURE: 78 MMHG | BODY MASS INDEX: 29.81 KG/M2 | SYSTOLIC BLOOD PRESSURE: 122 MMHG | WEIGHT: 163 LBS

## 2025-05-12 DIAGNOSIS — J45.909 ASTHMA DURING PREGNANCY: ICD-10-CM

## 2025-05-12 DIAGNOSIS — Z28.39 MATERNAL VARICELLA, NON-IMMUNE: ICD-10-CM

## 2025-05-12 DIAGNOSIS — O21.9 NAUSEA AND VOMITING DURING PREGNANCY: ICD-10-CM

## 2025-05-12 DIAGNOSIS — O99.340 ANXIETY DURING PREGNANCY: ICD-10-CM

## 2025-05-12 DIAGNOSIS — O99.619 GASTROESOPHAGEAL REFLUX IN PREGNANCY: ICD-10-CM

## 2025-05-12 DIAGNOSIS — Z28.39 RUBELLA NON-IMMUNE STATUS, ANTEPARTUM: ICD-10-CM

## 2025-05-12 DIAGNOSIS — O09.899 RUBELLA NON-IMMUNE STATUS, ANTEPARTUM: ICD-10-CM

## 2025-05-12 DIAGNOSIS — Z3A.11 11 WEEKS GESTATION OF PREGNANCY: ICD-10-CM

## 2025-05-12 DIAGNOSIS — F41.9 ANXIETY DURING PREGNANCY: ICD-10-CM

## 2025-05-12 DIAGNOSIS — F32.A DEPRESSION AFFECTING PREGNANCY: Primary | ICD-10-CM

## 2025-05-12 DIAGNOSIS — O99.519 ASTHMA DURING PREGNANCY: ICD-10-CM

## 2025-05-12 DIAGNOSIS — O99.340 DEPRESSION AFFECTING PREGNANCY: Primary | ICD-10-CM

## 2025-05-12 DIAGNOSIS — O09.899 MATERNAL VARICELLA, NON-IMMUNE: ICD-10-CM

## 2025-05-12 DIAGNOSIS — K21.9 GASTROESOPHAGEAL REFLUX IN PREGNANCY: ICD-10-CM

## 2025-06-09 ENCOUNTER — ROUTINE PRENATAL (OUTPATIENT)
Dept: OBSTETRICS AND GYNECOLOGY | Facility: CLINIC | Age: 20
End: 2025-06-09
Payer: MEDICAID

## 2025-06-09 VITALS — DIASTOLIC BLOOD PRESSURE: 78 MMHG | BODY MASS INDEX: 30 KG/M2 | SYSTOLIC BLOOD PRESSURE: 120 MMHG | WEIGHT: 164 LBS

## 2025-06-09 DIAGNOSIS — Z28.39 MATERNAL VARICELLA, NON-IMMUNE: ICD-10-CM

## 2025-06-09 DIAGNOSIS — F32.A DEPRESSION AFFECTING PREGNANCY: Primary | ICD-10-CM

## 2025-06-09 DIAGNOSIS — O21.9 NAUSEA AND VOMITING DURING PREGNANCY: ICD-10-CM

## 2025-06-09 DIAGNOSIS — O99.340 ANXIETY DURING PREGNANCY: ICD-10-CM

## 2025-06-09 DIAGNOSIS — O99.340 DEPRESSION AFFECTING PREGNANCY: Primary | ICD-10-CM

## 2025-06-09 DIAGNOSIS — O09.899 MATERNAL VARICELLA, NON-IMMUNE: ICD-10-CM

## 2025-06-09 DIAGNOSIS — F41.9 ANXIETY DURING PREGNANCY: ICD-10-CM

## 2025-06-09 DIAGNOSIS — O99.519 ASTHMA DURING PREGNANCY: ICD-10-CM

## 2025-06-09 DIAGNOSIS — K21.9 GASTROESOPHAGEAL REFLUX IN PREGNANCY: ICD-10-CM

## 2025-06-09 DIAGNOSIS — Z28.39 RUBELLA NON-IMMUNE STATUS, ANTEPARTUM: ICD-10-CM

## 2025-06-09 DIAGNOSIS — O99.619 GASTROESOPHAGEAL REFLUX IN PREGNANCY: ICD-10-CM

## 2025-06-09 DIAGNOSIS — J45.909 ASTHMA DURING PREGNANCY: ICD-10-CM

## 2025-06-09 DIAGNOSIS — O09.899 RUBELLA NON-IMMUNE STATUS, ANTEPARTUM: ICD-10-CM

## 2025-06-09 DIAGNOSIS — Z3A.15 15 WEEKS GESTATION OF PREGNANCY: ICD-10-CM

## 2025-06-09 LAB
BILIRUB UR QL STRIP: ABNORMAL
GLUCOSE UR QL STRIP: NEGATIVE
KETONES UR QL STRIP: ABNORMAL
LEUKOCYTE ESTERASE UR QL STRIP: NEGATIVE
PH, POC UA: ABNORMAL
POC BLOOD, URINE: ABNORMAL
POC NITRATES, URINE: NEGATIVE
PROT UR QL STRIP: NEGATIVE
SP GR UR STRIP: ABNORMAL (ref 1–1.03)
UROBILINOGEN UR STRIP-ACNC: ABNORMAL (ref 0.3–2.2)

## 2025-06-09 PROCEDURE — 99214 OFFICE O/P EST MOD 30 MIN: CPT | Mod: TH,,, | Performed by: OBSTETRICS & GYNECOLOGY

## 2025-06-09 NOTE — PROGRESS NOTES
HPI  20 y.o.  at 15w2d  here for OB check.  Nausea improved, but still present daily. Worse after coughing.         ROS  Review of Systems   Constitutional:  Negative for chills and fever.   Gastrointestinal:  Negative for abdominal pain, constipation and diarrhea.   Genitourinary:  Negative for flank pain, genital sores, pelvic pain, urgency, vaginal bleeding, vaginal discharge, vaginal pain, postcoital bleeding and vaginal odor.         OBJECTIVE  /78   Wt 74.4 kg (164 lb)   LMP 2025 (Exact Date) Comment: miscarried 24, no menstration since that time  BMI 30.00 kg/m²     Gen: No distress  Abdomen: Gravid, non-tender  Extremities: No edema    FHT: 155        ASSESSMENT  1. Depression affecting pregnancy    2. 15 weeks gestation of pregnancy  - POCT Urinalysis, Dipstick, Automated, W/O Scope  - US OB 14+ Wks, TransAbd, Single Gestation; Future    3. Anxiety during pregnancy    4. Gastroesophageal reflux in pregnancy    5. Rubella non-immune status, antepartum    6. Nausea and vomiting during pregnancy    7. Maternal varicella, non-immune    8. Asthma during pregnancy        PLAN  Reviewed standard labor unit and kick count precautions.  Discussed pre-eclampsia precautions.  Discussed COVID-19 risks, social distancing, and isolation if respiratory symptoms develop.     Anatomy scan to be done around 20 weeks gestation. Estimated Date of Delivery: 25  B6/unisom prn N/V  Consider OTC prilosec for reflux  Mood stable    RTC 4 weeks

## 2025-06-14 ENCOUNTER — PATIENT MESSAGE (OUTPATIENT)
Dept: OTHER | Facility: OTHER | Age: 20
End: 2025-06-14
Payer: MEDICAID

## 2025-06-21 ENCOUNTER — PATIENT MESSAGE (OUTPATIENT)
Dept: OTHER | Facility: OTHER | Age: 20
End: 2025-06-21
Payer: MEDICAID

## 2025-06-23 ENCOUNTER — TELEPHONE (OUTPATIENT)
Dept: OBSTETRICS AND GYNECOLOGY | Facility: CLINIC | Age: 20
End: 2025-06-23
Payer: MEDICAID

## 2025-06-23 DIAGNOSIS — O26.892 PREGNANCY HEADACHE IN SECOND TRIMESTER: Primary | ICD-10-CM

## 2025-06-23 DIAGNOSIS — R51.9 PREGNANCY HEADACHE IN SECOND TRIMESTER: Primary | ICD-10-CM

## 2025-06-23 NOTE — TELEPHONE ENCOUNTER
Notified patient that KYLEE is out today, will speak with him tomorrow. Pt verbalized understanding.

## 2025-06-23 NOTE — TELEPHONE ENCOUNTER
----- Message from Brylee sent at 6/23/2025 10:07 AM CDT -----  Regarding: Pt Advice  Contact: Jael  Patient called stating that she has been having frequent migraines for the last two weeks. She has been taking tylenol for them but states that it is not helping. She said that they last for 2 days then go away for a day then return. She wants to know if she can be prescribed some medication to help. Patient call back number [phone].      [facility] - [address] [address]  Phone: [phone] Fax: [phone]

## 2025-06-25 RX ORDER — BUTALBITAL, ACETAMINOPHEN AND CAFFEINE 50; 325; 40 MG/1; MG/1; MG/1
1 TABLET ORAL EVERY 4 HOURS PRN
Qty: 30 TABLET | Refills: 0 | Status: SHIPPED | OUTPATIENT
Start: 2025-06-25 | End: 2025-07-25

## 2025-06-25 NOTE — TELEPHONE ENCOUNTER
Per , fioricet q 4 hours PRN headaches, dispense 30 with 0 refills. PO hydration, precautions. voiced understanding.

## 2025-06-26 ENCOUNTER — TELEPHONE (OUTPATIENT)
Dept: OBSTETRICS AND GYNECOLOGY | Facility: CLINIC | Age: 20
End: 2025-06-26
Payer: MEDICAID

## 2025-06-26 NOTE — TELEPHONE ENCOUNTER
----- Message from Brylee sent at 6/26/2025 10:42 AM CDT -----  Regarding: Pt Advice  Contact: Jael  Patient called stating that [facility] had not received her prescription. Called the pharmacy and they verified that they have not received a new prescription for the patient. butalbital-acetaminophen-caffeine -40 mg (FIORICET, ESGIC) -40 mg per tablet    Patient call back number [phone]

## 2025-07-09 NOTE — PROGRESS NOTES
"HPI  20 y.o.  at 20w2d  here for OB check.  Doing well.   Reports positive fetal movement. denies LOF, CTX.       ROS  Review of Systems   Constitutional:  Negative for chills and fever.   Gastrointestinal:  Negative for abdominal pain, constipation and diarrhea.   Genitourinary:  Negative for flank pain, genital sores, pelvic pain, urgency, vaginal bleeding, vaginal discharge, vaginal pain, postcoital bleeding and vaginal odor.         OBJECTIVE  /68   Temp 98.2 °F (36.8 °C)   Ht 5' 2" (1.575 m)   LMP 2025 (Exact Date) Comment: miscarried 24, no menstration since that time  BMI 30.00 kg/m²     Gen: No distress  Abdomen: Gravid, non-tender  Extremities: No edema    FHT: 140    ASSESSMENT  1. Maternal varicella, non-immune    2. 20 weeks gestation of pregnancy  - POCT Urinalysis, Dipstick, Automated, W/O Scope    3. Depression affecting pregnancy    4. Anxiety during pregnancy    5. Rubella non-immune status, antepartum    6. Asthma during pregnancy    7. Gastroesophageal reflux in pregnancy        PLAN  Reviewed standard labor unit and kick count precautions.  Discussed pre-eclampsia precautions.  Discussed COVID-19 risks, social distancing, and isolation if respiratory symptoms develop.     Anatomy scan 7/15    Mood stable    RTC 4 weeks ob check     This note was transcribed by Denae Turcios. There may be transcription errors as a result, however minimal. Effort has been made to ensure accuracy of transcription, but any obvious errors or omissions should be clarified with the author of the document.       I agree with the above documentation.       "

## 2025-07-12 ENCOUNTER — PATIENT MESSAGE (OUTPATIENT)
Dept: OTHER | Facility: OTHER | Age: 20
End: 2025-07-12
Payer: MEDICAID

## 2025-07-14 ENCOUNTER — ROUTINE PRENATAL (OUTPATIENT)
Dept: OBSTETRICS AND GYNECOLOGY | Facility: CLINIC | Age: 20
End: 2025-07-14
Payer: MEDICAID

## 2025-07-14 VITALS
DIASTOLIC BLOOD PRESSURE: 68 MMHG | SYSTOLIC BLOOD PRESSURE: 110 MMHG | HEIGHT: 62 IN | TEMPERATURE: 98 F | BODY MASS INDEX: 30 KG/M2

## 2025-07-14 DIAGNOSIS — F41.9 ANXIETY DURING PREGNANCY: ICD-10-CM

## 2025-07-14 DIAGNOSIS — Z28.39 RUBELLA NON-IMMUNE STATUS, ANTEPARTUM: ICD-10-CM

## 2025-07-14 DIAGNOSIS — J45.909 ASTHMA DURING PREGNANCY: ICD-10-CM

## 2025-07-14 DIAGNOSIS — O99.340 DEPRESSION AFFECTING PREGNANCY: ICD-10-CM

## 2025-07-14 DIAGNOSIS — Z28.39 MATERNAL VARICELLA, NON-IMMUNE: Primary | ICD-10-CM

## 2025-07-14 DIAGNOSIS — K21.9 GASTROESOPHAGEAL REFLUX IN PREGNANCY: ICD-10-CM

## 2025-07-14 DIAGNOSIS — O09.899 RUBELLA NON-IMMUNE STATUS, ANTEPARTUM: ICD-10-CM

## 2025-07-14 DIAGNOSIS — O99.519 ASTHMA DURING PREGNANCY: ICD-10-CM

## 2025-07-14 DIAGNOSIS — Z3A.20 20 WEEKS GESTATION OF PREGNANCY: ICD-10-CM

## 2025-07-14 DIAGNOSIS — O09.899 MATERNAL VARICELLA, NON-IMMUNE: Primary | ICD-10-CM

## 2025-07-14 DIAGNOSIS — O99.340 ANXIETY DURING PREGNANCY: ICD-10-CM

## 2025-07-14 DIAGNOSIS — O99.619 GASTROESOPHAGEAL REFLUX IN PREGNANCY: ICD-10-CM

## 2025-07-14 DIAGNOSIS — F32.A DEPRESSION AFFECTING PREGNANCY: ICD-10-CM

## 2025-07-15 ENCOUNTER — HOSPITAL ENCOUNTER (OUTPATIENT)
Dept: RADIOLOGY | Facility: HOSPITAL | Age: 20
Discharge: HOME | End: 2025-07-15
Attending: OBSTETRICS & GYNECOLOGY
Payer: MEDICAID

## 2025-07-15 DIAGNOSIS — Z3A.15 15 WEEKS GESTATION OF PREGNANCY: ICD-10-CM

## 2025-07-15 PROCEDURE — 76805 OB US >/= 14 WKS SNGL FETUS: CPT | Mod: TC

## 2025-08-09 ENCOUNTER — PATIENT MESSAGE (OUTPATIENT)
Dept: OTHER | Facility: OTHER | Age: 20
End: 2025-08-09
Payer: MEDICAID

## 2025-08-11 ENCOUNTER — ROUTINE PRENATAL (OUTPATIENT)
Dept: OBSTETRICS AND GYNECOLOGY | Facility: CLINIC | Age: 20
End: 2025-08-11
Payer: MEDICAID

## 2025-08-11 VITALS
SYSTOLIC BLOOD PRESSURE: 116 MMHG | BODY MASS INDEX: 32.81 KG/M2 | WEIGHT: 179.38 LBS | DIASTOLIC BLOOD PRESSURE: 84 MMHG

## 2025-08-11 DIAGNOSIS — O99.619 GASTROESOPHAGEAL REFLUX IN PREGNANCY: ICD-10-CM

## 2025-08-11 DIAGNOSIS — O99.340 DEPRESSION AFFECTING PREGNANCY: Primary | ICD-10-CM

## 2025-08-11 DIAGNOSIS — J45.909 ASTHMA DURING PREGNANCY: ICD-10-CM

## 2025-08-11 DIAGNOSIS — K21.9 GASTROESOPHAGEAL REFLUX IN PREGNANCY: ICD-10-CM

## 2025-08-11 DIAGNOSIS — F41.9 ANXIETY DURING PREGNANCY: ICD-10-CM

## 2025-08-11 DIAGNOSIS — O99.519 ASTHMA DURING PREGNANCY: ICD-10-CM

## 2025-08-11 DIAGNOSIS — F32.A DEPRESSION AFFECTING PREGNANCY: Primary | ICD-10-CM

## 2025-08-11 DIAGNOSIS — O21.9 NAUSEA AND VOMITING DURING PREGNANCY: ICD-10-CM

## 2025-08-11 DIAGNOSIS — O09.899 MATERNAL VARICELLA, NON-IMMUNE: ICD-10-CM

## 2025-08-11 DIAGNOSIS — Z3A.24 24 WEEKS GESTATION OF PREGNANCY: ICD-10-CM

## 2025-08-11 DIAGNOSIS — Z28.39 MATERNAL VARICELLA, NON-IMMUNE: ICD-10-CM

## 2025-08-11 DIAGNOSIS — O99.340 ANXIETY DURING PREGNANCY: ICD-10-CM

## 2025-08-11 DIAGNOSIS — O09.899 RUBELLA NON-IMMUNE STATUS, ANTEPARTUM: ICD-10-CM

## 2025-08-11 DIAGNOSIS — Z28.39 RUBELLA NON-IMMUNE STATUS, ANTEPARTUM: ICD-10-CM

## 2025-08-23 ENCOUNTER — PATIENT MESSAGE (OUTPATIENT)
Dept: OTHER | Facility: OTHER | Age: 20
End: 2025-08-23
Payer: MEDICAID